# Patient Record
Sex: FEMALE | Race: WHITE | NOT HISPANIC OR LATINO | Employment: FULL TIME | URBAN - METROPOLITAN AREA
[De-identification: names, ages, dates, MRNs, and addresses within clinical notes are randomized per-mention and may not be internally consistent; named-entity substitution may affect disease eponyms.]

---

## 2017-03-13 DIAGNOSIS — E55.9 VITAMIN D DEFICIENCY: Primary | ICD-10-CM

## 2017-03-13 DIAGNOSIS — E06.3 HYPOTHYROIDISM DUE TO HASHIMOTO'S THYROIDITIS: Primary | ICD-10-CM

## 2017-03-13 DIAGNOSIS — E03.8 HYPOTHYROIDISM DUE TO HASHIMOTO'S THYROIDITIS: Primary | ICD-10-CM

## 2017-03-13 DIAGNOSIS — E06.3 HASHIMOTO'S DISEASE: ICD-10-CM

## 2017-03-14 LAB
ALBUMIN SERPL-MCNC: 4.4 G/DL (ref 3.5–5.2)
ALBUMIN/GLOB SERPL: 1.5 G/DL
ALP SERPL-CCNC: 115 U/L (ref 39–117)
ALT SERPL-CCNC: 23 U/L (ref 1–33)
AST SERPL-CCNC: 19 U/L (ref 1–32)
BILIRUB SERPL-MCNC: 0.2 MG/DL (ref 0.1–1.2)
BUN SERPL-MCNC: 12 MG/DL (ref 6–20)
BUN/CREAT SERPL: 10.9 (ref 7–25)
CALCIUM SERPL-MCNC: 10.3 MG/DL (ref 8.6–10.5)
CHLORIDE SERPL-SCNC: 101 MMOL/L (ref 98–107)
CO2 SERPL-SCNC: 25.2 MMOL/L (ref 22–29)
CREAT SERPL-MCNC: 1.1 MG/DL (ref 0.57–1)
GLOBULIN SER CALC-MCNC: 2.9 GM/DL
GLUCOSE SERPL-MCNC: 111 MG/DL (ref 65–99)
POTASSIUM SERPL-SCNC: 4.1 MMOL/L (ref 3.5–5.2)
PROT SERPL-MCNC: 7.3 G/DL (ref 6–8.5)
SODIUM SERPL-SCNC: 144 MMOL/L (ref 136–145)
T4 FREE SERPL-MCNC: 1.49 NG/DL (ref 0.93–1.7)
TSH SERPL DL<=0.005 MIU/L-ACNC: 1.18 MIU/ML (ref 0.27–4.2)

## 2017-03-15 LAB — 25(OH)D3+25(OH)D2 SERPL-MCNC: 41.3 NG/ML (ref 30–100)

## 2017-03-17 ENCOUNTER — RESULTS ENCOUNTER (OUTPATIENT)
Dept: ENDOCRINOLOGY | Age: 57
End: 2017-03-17

## 2017-03-17 DIAGNOSIS — E55.9 VITAMIN D DEFICIENCY: ICD-10-CM

## 2017-03-17 DIAGNOSIS — E03.8 HYPOTHYROIDISM DUE TO HASHIMOTO'S THYROIDITIS: ICD-10-CM

## 2017-03-17 DIAGNOSIS — E06.3 HYPOTHYROIDISM DUE TO HASHIMOTO'S THYROIDITIS: ICD-10-CM

## 2017-03-17 DIAGNOSIS — E06.3 HASHIMOTO'S DISEASE: ICD-10-CM

## 2017-06-19 ENCOUNTER — OFFICE VISIT (OUTPATIENT)
Dept: ENDOCRINOLOGY | Age: 57
End: 2017-06-19

## 2017-06-19 VITALS
OXYGEN SATURATION: 97 % | DIASTOLIC BLOOD PRESSURE: 86 MMHG | SYSTOLIC BLOOD PRESSURE: 126 MMHG | HEIGHT: 68 IN | HEART RATE: 97 BPM | WEIGHT: 209.6 LBS | BODY MASS INDEX: 31.77 KG/M2

## 2017-06-19 DIAGNOSIS — E03.8 HYPOTHYROIDISM DUE TO HASHIMOTO'S THYROIDITIS: Primary | ICD-10-CM

## 2017-06-19 DIAGNOSIS — E55.9 VITAMIN D DEFICIENCY: ICD-10-CM

## 2017-06-19 DIAGNOSIS — E06.3 HYPOTHYROIDISM DUE TO HASHIMOTO'S THYROIDITIS: Primary | ICD-10-CM

## 2017-06-19 DIAGNOSIS — E06.3 HASHIMOTO'S DISEASE: ICD-10-CM

## 2017-06-19 DIAGNOSIS — R63.5 ABNORMAL WEIGHT GAIN: ICD-10-CM

## 2017-06-19 DIAGNOSIS — R53.82 CHRONIC FATIGUE: ICD-10-CM

## 2017-06-19 PROCEDURE — 99214 OFFICE O/P EST MOD 30 MIN: CPT | Performed by: INTERNAL MEDICINE

## 2017-06-19 RX ORDER — VITAMIN E 268 MG
400 CAPSULE ORAL DAILY
COMMUNITY

## 2017-06-19 RX ORDER — LANOLIN ALCOHOL/MO/W.PET/CERES
400 CREAM (GRAM) TOPICAL DAILY
COMMUNITY

## 2017-06-20 LAB
T3 SERPL-MCNC: 113.9 NG/DL (ref 80–200)
T4 FREE SERPL-MCNC: 1.48 NG/DL (ref 0.93–1.7)
TSH SERPL DL<=0.005 MIU/L-ACNC: 0.88 MIU/ML (ref 0.27–4.2)

## 2017-06-23 RX ORDER — LEVOTHYROXINE SODIUM 88 UG/1
88 TABLET ORAL DAILY
Qty: 30 TABLET | Refills: 11 | Status: SHIPPED | OUTPATIENT
Start: 2017-06-23 | End: 2017-06-23

## 2017-06-23 RX ORDER — LEVOTHYROXINE SODIUM 88 UG/1
TABLET ORAL
Qty: 30 TABLET | Refills: 11 | Status: SHIPPED | OUTPATIENT
Start: 2017-06-23 | End: 2017-12-22 | Stop reason: SDUPTHER

## 2017-12-11 ENCOUNTER — RESULTS ENCOUNTER (OUTPATIENT)
Dept: ENDOCRINOLOGY | Age: 57
End: 2017-12-11

## 2017-12-11 DIAGNOSIS — E03.8 OTHER SPECIFIED HYPOTHYROIDISM: ICD-10-CM

## 2017-12-11 DIAGNOSIS — E55.9 VITAMIN D DEFICIENCY: ICD-10-CM

## 2017-12-11 DIAGNOSIS — E06.3 HASHIMOTO'S DISEASE: Primary | ICD-10-CM

## 2017-12-11 DIAGNOSIS — E06.3 HASHIMOTO'S DISEASE: ICD-10-CM

## 2017-12-22 ENCOUNTER — OFFICE VISIT (OUTPATIENT)
Dept: ENDOCRINOLOGY | Age: 57
End: 2017-12-22

## 2017-12-22 VITALS
BODY MASS INDEX: 31.22 KG/M2 | SYSTOLIC BLOOD PRESSURE: 120 MMHG | HEART RATE: 89 BPM | DIASTOLIC BLOOD PRESSURE: 86 MMHG | WEIGHT: 206 LBS | HEIGHT: 68 IN

## 2017-12-22 DIAGNOSIS — R63.5 ABNORMAL WEIGHT GAIN: ICD-10-CM

## 2017-12-22 DIAGNOSIS — R53.82 CHRONIC FATIGUE: ICD-10-CM

## 2017-12-22 DIAGNOSIS — E03.8 HYPOTHYROIDISM DUE TO HASHIMOTO'S THYROIDITIS: ICD-10-CM

## 2017-12-22 DIAGNOSIS — E06.3 HASHIMOTO'S DISEASE: Primary | ICD-10-CM

## 2017-12-22 DIAGNOSIS — E55.9 VITAMIN D DEFICIENCY: ICD-10-CM

## 2017-12-22 DIAGNOSIS — E06.3 HYPOTHYROIDISM DUE TO HASHIMOTO'S THYROIDITIS: ICD-10-CM

## 2017-12-22 LAB
25(OH)D3+25(OH)D2 SERPL-MCNC: 44.6 NG/ML (ref 30–100)
ALBUMIN SERPL-MCNC: 4.5 G/DL (ref 3.5–5.2)
ALBUMIN/GLOB SERPL: 1.3 G/DL
ALP SERPL-CCNC: 122 U/L (ref 39–117)
ALT SERPL-CCNC: 19 U/L (ref 1–33)
AST SERPL-CCNC: 21 U/L (ref 1–32)
BILIRUB SERPL-MCNC: 0.3 MG/DL (ref 0.1–1.2)
BUN SERPL-MCNC: 11 MG/DL (ref 6–20)
BUN/CREAT SERPL: 12.5 (ref 7–25)
CALCIUM SERPL-MCNC: 9.8 MG/DL (ref 8.6–10.5)
CHLORIDE SERPL-SCNC: 100 MMOL/L (ref 98–107)
CO2 SERPL-SCNC: 27.3 MMOL/L (ref 22–29)
CREAT SERPL-MCNC: 0.88 MG/DL (ref 0.57–1)
GLOBULIN SER CALC-MCNC: 3.4 GM/DL
GLUCOSE SERPL-MCNC: 91 MG/DL (ref 65–99)
POTASSIUM SERPL-SCNC: 4.4 MMOL/L (ref 3.5–5.2)
PROT SERPL-MCNC: 7.9 G/DL (ref 6–8.5)
SODIUM SERPL-SCNC: 142 MMOL/L (ref 136–145)
T3 SERPL-MCNC: 94.9 NG/DL (ref 80–200)
T4 FREE SERPL-MCNC: 1.55 NG/DL (ref 0.93–1.7)
TSH SERPL DL<=0.005 MIU/L-ACNC: 0.86 MIU/ML (ref 0.27–4.2)

## 2017-12-22 PROCEDURE — 99214 OFFICE O/P EST MOD 30 MIN: CPT | Performed by: INTERNAL MEDICINE

## 2017-12-22 RX ORDER — LEVOTHYROXINE SODIUM 88 UG/1
TABLET ORAL
Qty: 30 TABLET | Refills: 11 | Status: SHIPPED | OUTPATIENT
Start: 2017-12-22 | End: 2018-03-27 | Stop reason: SDUPTHER

## 2017-12-22 NOTE — PROGRESS NOTES
57 y.o.    Patient Care Team:  Dev Lopez MD as PCP - General  Dev Lopez MD as PCP - Family Medicine    Chief Complaint:      F/U HYPOTHYROIDISM.  NO RECENT LABS.    Subjective     HPI  Patient is a 57-year-old white female with a past history of hypothyroidism and Hashimoto's thyroiditis came for follow-up  Patient is accompanied by her  today    Hypothyroidism  Patient is currently taking Synthroid 88 µg daily.  She reports compliance with the medication  She denies any side effects  Hashimoto's thyroiditis  Patient denies any rash or difficulty swallowing or change in voice  Vitamin D deficiency  Patient is currently taking 50,000 units vitamin D 2 weekly  She denies any side effects on the medication  Abnormal weight gain  Patient managed to lose about 8 pounds in the past one year  I advised her to continue to lose weight  She currently weighs 206 pounds the BMI of 31.3  Chronic fatigue  Patient has been expressing chronic fatigue over the past several years  She has improved slightly when she went on gluten-free diet  She still has issues with the fatigue on a daily basis      Interval History            Summary  Patient reported that in 1982 during her first pregnancy at the age of 22, soon after delivery she was noted to be hyper thyroid and was placed on PTU for one year. After that she was able to discontinue medication and was not on any medication a long time.  At the age of 38 she had a hysterectomy  At the age of 43, she reports viral infection which was very severe and the recovery was prolonged. Subsequently she started gaining weight and became extremely fatigued and was diagnosed with hypothyroidism. At this time she started Levoxyl and apparently briefly tried Synthroid but had severe gastroesophageal reflux symptoms.  When the Levoxyl went out of the market, she started generic levothyroxine 125 mcg.  After Levoxyl came back to the market she went back on Levoxyl 125 mcg        She was also diagnosed with obstructive sleep apnea and is currently on CPAP mask for the past 5 years.  Abnormal weight gain she'll become a very big problem and she is unable to lose weight.  She was also diagnosed with severe vitamin D deficiency and started taking vitamin D. Since taking vitamin D her brain fog has lifted somewhat.  But she continues to make silly mistakes at work and she is a , becoming emotionally labile, crying easily and experiencing severe fatigue.      Patient reported that she is taking Levoxyl correctly on empty stomach in the morning does not take any medication for at least one hour.   patient reported she had ultrasound of the thyroid approximate 10 years ago and was told that her thyroid is basically atrophied  The following portions of the patient's history were reviewed and updated as appropriate: allergies, current medications, past family history, past medical history, past social history, past surgical history and problem list.    Past Medical History:   Diagnosis Date   • Hypothyroidism      Family History   Problem Relation Age of Onset   • COPD Father    • Hyperlipidemia Father    • Heart disease Father      Social History     Social History   • Marital status:      Spouse name: N/A   • Number of children: N/A   • Years of education: N/A     Occupational History   • Not on file.     Social History Main Topics   • Smoking status: Never Smoker   • Smokeless tobacco: Not on file   • Alcohol use Yes      Comment: OCC   • Drug use: No   • Sexual activity: Not on file     Other Topics Concern   • Not on file     Social History Narrative     No Known Allergies    Current Outpatient Prescriptions:   •  fluticasone (FLONASE) 50 MCG/ACT nasal spray, , Disp: , Rfl:   •  folic acid (FOLVITE) 400 MCG tablet, Take 400 mcg by mouth Daily., Disp: , Rfl:   •  levothyroxine (SYNTHROID) 88 MCG tablet, 88 mcg oral daily, Disp: 30 tablet, Rfl: 11  •  Omega-3 Fatty Acids  "(FISH OIL) 1000 MG capsule capsule, Take  by mouth daily with breakfast., Disp: , Rfl:   •  Selenium 200 MCG capsule, Take  by mouth., Disp: , Rfl:   •  vitamin A 8000 UNIT capsule, Take 8,000 Units by mouth Daily., Disp: , Rfl:   •  vitamin B-12 (CYANOCOBALAMIN) 1000 MCG tablet, Take 1,000 mcg by mouth daily., Disp: , Rfl:   •  vitamin D (ERGOCALCIFEROL) 91110 UNITS capsule capsule, , Disp: , Rfl:   •  vitamin E 400 UNIT capsule, Take 400 Units by mouth Daily., Disp: , Rfl:   •  Zinc 50 MG capsule, Take  by mouth., Disp: , Rfl:         Review of Systems   Constitutional: Negative for chills, fatigue and fever.   Cardiovascular: Negative for chest pain and palpitations.   Gastrointestinal: Negative for abdominal pain, constipation, diarrhea, nausea and vomiting.   Endocrine: Negative for cold intolerance and heat intolerance.   All other systems reviewed and are negative.      Objective       Vitals:    12/22/17 1030   BP: 120/86   Pulse: 89   Weight: 93.4 kg (206 lb)   Height: 172.7 cm (68\")     Body mass index is 31.32 kg/(m^2).      Physical Exam   Constitutional: She is oriented to person, place, and time. She appears well-developed and well-nourished.   obese   HENT:   Head: Normocephalic and atraumatic.   Eyes: EOM are normal. Pupils are equal, round, and reactive to light.   Neck: Normal range of motion. Neck supple. Thyromegaly (Thyroid gland is firm to palpation, nontender, moving well on swallowing, no nodules felt) present.   Cardiovascular: Normal rate, regular rhythm, normal heart sounds and intact distal pulses.    Pulmonary/Chest: Effort normal and breath sounds normal. No respiratory distress. She has no wheezes.   Abdominal: Soft. Bowel sounds are normal. She exhibits distension. There is no tenderness.   Musculoskeletal: Normal range of motion. She exhibits no edema.   Lymphadenopathy:     She has no cervical adenopathy.   Neurological: She is alert and oriented to person, place, and time.   Skin: " Skin is warm.   Psychiatric: She has a normal mood and affect. Her behavior is normal.   Nursing note and vitals reviewed.    Results Review:     I reviewed the patient's new clinical results.    Medical records reviewed  Summary:      Office Visit on 06/19/2017   Component Date Value Ref Range Status   • Free T4 06/19/2017 1.48  0.93 - 1.70 ng/dL Final   • TSH 06/19/2017 0.880  0.270 - 4.200 mIU/mL Final   • T3, Total 06/19/2017 113.9  80.0 - 200.0 ng/dL Final     No results found for: HGBA1C  Lab Results   Component Value Date    CREATININE 1.10 (H) 03/14/2017     Imaging Results (most recent)     None                Assessment and Plan:    Geni was seen today for hypothyroidism.    Diagnoses and all orders for this visit:    Hashimoto's disease  -     Comprehensive Metabolic Panel  -     T4, Free  -     TSH  -     T3  -     Vitamin D 25 Hydroxy    Vitamin D deficiency  -     Comprehensive Metabolic Panel  -     T4, Free  -     TSH  -     T3  -     Vitamin D 25 Hydroxy    Hypothyroidism due to Hashimoto's thyroiditis  -     Comprehensive Metabolic Panel  -     T4, Free  -     TSH  -     T3  -     Vitamin D 25 Hydroxy    Abnormal weight gain    Chronic fatigue    Other orders  -     levothyroxine (SYNTHROID) 88 MCG tablet; 88 mcg oral daily      Patient is currently taking levothyroxine 88 µg daily.  She is tolerating the medication  Her energy levels have improved since following the gluten-free diet  Her GI symptoms also improved significantly but she is not losing weight  She is planning to do ketogenic diet to lose weight    I advised the patient instead to consider low-carb diet  I also briefly discussed the use of phentermine for weight loss  Patient wants to try the diet first and exercise    Patient will get lab testing done today  She will return to follow-up in 6 months     The total time spent for old record and lab review and face- to- face was more than 25 min of which greater than 15 min of time  "was spent on counseling the patient on recommended evaluation and treatment options, instructions for management/treatment and /or follow up  and importance of compliance with chosen management or treatment options  Tano Puckett MD. FACE    12/22/17      EMR Dragon / transcription disclaimer:     \"Dictated utilizing Dragon dictation\".         "

## 2018-03-27 ENCOUNTER — TELEPHONE (OUTPATIENT)
Dept: ENDOCRINOLOGY | Age: 58
End: 2018-03-27

## 2018-03-27 RX ORDER — LEVOTHYROXINE SODIUM 88 MCG
TABLET ORAL
Qty: 30 TABLET | Refills: 1 | Status: SHIPPED | OUTPATIENT
Start: 2018-03-27

## 2018-03-27 NOTE — TELEPHONE ENCOUNTER
----- Message from Angela Puentes sent at 3/27/2018  3:44 PM EDT -----  Contact: PATIENT     PATIENT/ANTHONY MCFARLANDUEST TO REFIL MEDICATION  MEDICATION:levothyroxine (SYNTHROID) 88 MCG tablet   MESSAGE: PATIENT HAS ASKED FOR THE FOLLOWING MEDICATION  TO BE REFILLED TO THE FOLLOWING PHARMACY   Pharmacy:  [facility] - [place], [place] - [address] [place] [address] - [phone]  - [phone] FX Phone:  [phone] Fax:  [phone]   Address:  [address] [place] [id number]    PHONE NUMBER:  [phone]    SCRIPT SENT

## 2018-04-02 ENCOUNTER — TELEPHONE (OUTPATIENT)
Dept: ENDOCRINOLOGY | Age: 58
End: 2018-04-02

## 2018-04-02 DIAGNOSIS — E06.3 HYPOTHYROIDISM DUE TO HASHIMOTO'S THYROIDITIS: Primary | ICD-10-CM

## 2018-04-02 DIAGNOSIS — E03.8 HYPOTHYROIDISM DUE TO HASHIMOTO'S THYROIDITIS: Primary | ICD-10-CM

## 2018-04-02 DIAGNOSIS — E06.3 HASHIMOTO'S DISEASE: ICD-10-CM

## 2018-04-02 DIAGNOSIS — E55.9 VITAMIN D DEFICIENCY: ICD-10-CM

## 2018-04-02 NOTE — TELEPHONE ENCOUNTER
----- Message from Angela Puentes sent at 4/2/2018  9:56 AM EDT -----  Contact: PATIENT    PATIENT IS REQUESTIN EXTERNAL LABS  FAX NUMBER:  [phone]  MESSAGE:  PATIENT IS ASKING FOR THE EXTERNAL LAB BE SENT OVER TO [facility]. ALSO PATIENT IS ASKING FOR A CALL BACK TO WHEN YOU DO SENT OUT THE LABS  SO WE CAN MAKE AN APPT.    PHONE NUMBER:     [phone]      LAB ORDERS FAXED TO [facility]

## 2018-04-04 ENCOUNTER — RESULTS ENCOUNTER (OUTPATIENT)
Dept: ENDOCRINOLOGY | Age: 58
End: 2018-04-04

## 2018-04-04 DIAGNOSIS — E55.9 VITAMIN D DEFICIENCY: ICD-10-CM

## 2018-04-04 DIAGNOSIS — E06.3 HASHIMOTO'S DISEASE: ICD-10-CM

## 2018-04-04 DIAGNOSIS — E03.8 HYPOTHYROIDISM DUE TO HASHIMOTO'S THYROIDITIS: ICD-10-CM

## 2018-04-04 DIAGNOSIS — E06.3 HYPOTHYROIDISM DUE TO HASHIMOTO'S THYROIDITIS: ICD-10-CM

## 2018-04-04 LAB
25(OH)D3+25(OH)D2 SERPL-MCNC: 55.6 NG/ML (ref 30–100)
ALBUMIN SERPL-MCNC: 4.1 G/DL (ref 3.5–5.5)
ALBUMIN/GLOB SERPL: 1.4 {RATIO} (ref 1.2–2.2)
ALP SERPL-CCNC: 100 IU/L (ref 39–117)
ALT SERPL-CCNC: 19 IU/L (ref 0–32)
AST SERPL-CCNC: 19 IU/L (ref 0–40)
BILIRUB SERPL-MCNC: <0.2 MG/DL (ref 0–1.2)
BUN SERPL-MCNC: 10 MG/DL (ref 6–24)
BUN/CREAT SERPL: 10 (ref 9–23)
CALCIUM SERPL-MCNC: 9.6 MG/DL (ref 8.7–10.2)
CHLORIDE SERPL-SCNC: 100 MMOL/L (ref 96–106)
CO2 SERPL-SCNC: 25 MMOL/L (ref 18–29)
CREAT SERPL-MCNC: 1.01 MG/DL (ref 0.57–1)
GFR SERPLBLD CREATININE-BSD FMLA CKD-EPI: 61 ML/MIN/1.73
GFR SERPLBLD CREATININE-BSD FMLA CKD-EPI: 70 ML/MIN/1.73
GLOBULIN SER CALC-MCNC: 2.9 G/DL (ref 1.5–4.5)
GLUCOSE SERPL-MCNC: 121 MG/DL (ref 65–99)
POTASSIUM SERPL-SCNC: 4 MMOL/L (ref 3.5–5.2)
PROT SERPL-MCNC: 7 G/DL (ref 6–8.5)
SODIUM SERPL-SCNC: 141 MMOL/L (ref 134–144)
T3 SERPL-MCNC: 99 NG/DL (ref 71–180)
T4 FREE SERPL-MCNC: 1.31 NG/DL (ref 0.82–1.77)
TSH SERPL DL<=0.005 MIU/L-ACNC: 2.23 UIU/ML (ref 0.45–4.5)

## 2018-04-13 ENCOUNTER — TELEPHONE (OUTPATIENT)
Dept: ENDOCRINOLOGY | Age: 58
End: 2018-04-13

## 2018-04-13 RX ORDER — LEVOTHYROXINE SODIUM 88 UG/1
88 CAPSULE ORAL DAILY
Qty: 30 CAPSULE | Refills: 3 | Status: SHIPPED | OUTPATIENT
Start: 2018-04-13

## 2018-04-13 NOTE — TELEPHONE ENCOUNTER
----- Message from Angela Puentes sent at 4/12/2018 10:04 AM EDT -----  Contact: [facility] REQUEST TO REFIL ISSUES WITH  MEDICATION:    MEDICATION:  SYNTHROID 88 MCG tablet   MESSAGE:  [facility] HAS CALLED IN REGARDS TO GETTING CLARIFICATION ON  THE MEDICATION.    THEY ARE STATING THE PATIENT HAS GOTTEN BRAND NAME FOR THIS MEDICATION AND THEY ARE NOT SURE WHICH ONE THEY NEED TO REFILL.    PHARMACY IS ASKING FOR A CALL BACK   PHONE NUMBER:  [phone]    SCRIP SENT FOR TIROSINT 88MCG

## 2020-05-31 ENCOUNTER — HOSPITAL ENCOUNTER (EMERGENCY)
Facility: HOSPITAL | Age: 60
Discharge: HOME | End: 2020-06-01
Attending: EMERGENCY MEDICINE | Admitting: EMERGENCY MEDICINE

## 2020-05-31 ENCOUNTER — APPOINTMENT (OUTPATIENT)
Dept: GENERAL RADIOLOGY | Facility: HOSPITAL | Age: 60
End: 2020-05-31

## 2020-05-31 DIAGNOSIS — S82.62XA CLOSED DISPLACED FRACTURE OF LATERAL MALLEOLUS OF LEFT FIBULA, INITIAL ENCOUNTER: ICD-10-CM

## 2020-05-31 DIAGNOSIS — S82.61XA CLOSED AVULSION FRACTURE OF LATERAL MALLEOLUS OF RIGHT FIBULA, INITIAL ENCOUNTER: Primary | ICD-10-CM

## 2020-05-31 PROCEDURE — 99284 EMERGENCY DEPT VISIT MOD MDM: CPT

## 2020-05-31 PROCEDURE — 73610 X-RAY EXAM OF ANKLE: CPT

## 2020-05-31 PROCEDURE — 73590 X-RAY EXAM OF LOWER LEG: CPT

## 2020-05-31 RX ORDER — ACETAMINOPHEN 500 MG
1000 TABLET ORAL ONCE
Status: DISCONTINUED | OUTPATIENT
Start: 2020-05-31 | End: 2020-05-31

## 2020-05-31 RX ORDER — IBUPROFEN 800 MG/1
800 TABLET ORAL ONCE
Status: COMPLETED | OUTPATIENT
Start: 2020-05-31 | End: 2020-05-31

## 2020-05-31 RX ADMIN — IBUPROFEN 800 MG: 800 TABLET ORAL at 22:51

## 2020-06-01 VITALS
BODY MASS INDEX: 31.83 KG/M2 | WEIGHT: 210 LBS | HEART RATE: 87 BPM | RESPIRATION RATE: 16 BRPM | OXYGEN SATURATION: 97 % | TEMPERATURE: 96 F | HEIGHT: 68 IN | SYSTOLIC BLOOD PRESSURE: 122 MMHG | DIASTOLIC BLOOD PRESSURE: 71 MMHG

## 2020-06-01 RX ORDER — HYDROCODONE BITARTRATE AND ACETAMINOPHEN 5; 325 MG/1; MG/1
1 TABLET ORAL EVERY 6 HOURS PRN
Qty: 15 TABLET | Refills: 0 | Status: SHIPPED | OUTPATIENT
Start: 2020-06-01

## 2020-06-01 NOTE — DISCHARGE INSTRUCTIONS
Return to the ER with any further concerns, should your condition change/worsen, or should you develop a fever.  Avoid weightbearing on the left ankle until cleared to do so by your orthopedist.

## 2020-06-01 NOTE — ED PROVIDER NOTES
MD ATTESTATION NOTE    The KULDIP and I have discussed this patient's history, physical exam, and treatment plan.  I have reviewed the documentation and personally had a face to face interaction with the patient. I affirm the documentation and agree with the treatment and plan.  The attached note describes my personal findings.    Patient presents with pain in both ankles after a fall.  She has been walking around on them all afternoon, but there beginning to hurt worse.    She has an Aircast on the left foot and nothing on the right.  There is some soft tissue swelling but no obvious deformity.    Surprisingly she has a spiral minimally displaced fracture of the left distal fibula.  She also has avulsion fractures of both the medial and lateral malleolus on the right ankle.  Neither ankle is unstable, and she is incredibly tough to be able to handle the pain.    We did place her in a walking boot for the left leg and an air splint for the right.  We have encouraged her to stay off of them as much as possible, ice them and follow-up with orthopedics     Javi Lopez MD  06/01/20 3881

## 2020-06-01 NOTE — ED PROVIDER NOTES
EMERGENCY DEPARTMENT ENCOUNTER    Room Number:  08/08  Date of encounter:  6/1/2020  PCP: Dev Lopez MD  Historian: Patient      HPI:  Chief Complaint: Bilateral ankles injury  A complete HPI/ROS/PMH/PSH/SH/FH are unobtainable due to: Nothing    Context: Geni York is a 59 y.o. female who presents to the ED c/o bilateral ankle injury that occurred earlier this evening.  The patient states she was walking down some steps initially rolling the left ankle and twisting the right ankle and recovering from the fall.  She describes the pain is tolerable 6 out of 10, sharp in nature, nonradiating, and localized to the lateral aspect of both ankles.  The pain is made worse with ambulation and improves with rest.  Treatment prior to arrival includes 100 mg of ibuprofen and rest.  The patient is able to ambulate on both ankles at this time.      PAST MEDICAL HISTORY  Active Ambulatory Problems     Diagnosis Date Noted   • Hypothyroidism 09/01/2016   • Chronic fatigue 09/01/2016   • Hashimoto's disease 09/01/2016   • Vitamin D deficiency 09/01/2016   • Abnormal weight gain 06/19/2017     Resolved Ambulatory Problems     Diagnosis Date Noted   • No Resolved Ambulatory Problems     No Additional Past Medical History         PAST SURGICAL HISTORY  Past Surgical History:   Procedure Laterality Date   • BREAST BIOPSY     • HYSTERECTOMY           FAMILY HISTORY  Family History   Problem Relation Age of Onset   • COPD Father    • Hyperlipidemia Father    • Heart disease Father          SOCIAL HISTORY  Social History     Socioeconomic History   • Marital status:      Spouse name: Not on file   • Number of children: Not on file   • Years of education: Not on file   • Highest education level: Not on file   Tobacco Use   • Smoking status: Never Smoker   • Smokeless tobacco: Never Used   Substance and Sexual Activity   • Alcohol use: Yes     Comment: OCC   • Drug use: No         ALLERGIES  Patient has no known  allergies.        REVIEW OF SYSTEMS  Review of Systems     All systems reviewed and negative except for those discussed in HPI.       PHYSICAL EXAM    I have reviewed the triage vital signs and nursing notes.    ED Triage Vitals   Temp Heart Rate Resp BP SpO2   05/31/20 2045 05/31/20 2045 05/31/20 2045 05/31/20 2057 05/31/20 2045   96 °F (35.6 °C) 106 18 151/100 98 %      Temp src Heart Rate Source Patient Position BP Location FiO2 (%)   05/31/20 2045 05/31/20 2045 -- 05/31/20 2253 --   Tympanic Monitor  Right arm        Physical Exam  GENERAL: No acute distress  HENT: nares patent mucous membranes normal.  NCAT  EYES: no scleral icterus, conjunctiva normal  CV: regular rhythm, regular rate, without rubs murmurs gallops, cap refill normal, +2 DP and PT bilateral  RESPIRATORY: normal effort  ABDOMEN: soft, nontender   MUSCULOSKELETAL: TTP bilateral lateral malleoli.  Slight limited dorsiflexion of the left ankle secondary to soft tissue swelling and pain.  Achilles and tendon intact bilateral, negative pain with foot squeeze bilateral, negative pain with proximal tib-fib squeeze bilateral  NEURO: alert, moves all extremities, follows commands, no focal neuro deficits, sharp sensation intact  SKIN: warm, dry, mild ecchymosis lateral aspect of left ankle        LAB RESULTS  No results found for this or any previous visit (from the past 24 hour(s)).    Ordered the above labs and independently reviewed the results.        RADIOLOGY  Xr Ankle 3+ View Bilateral    Result Date: 5/31/2020  4 VIEWS OF THE BILATERAL TIBIA AND FIBULA; 6 VIEWS OF THE BILATERAL ANKLES  HISTORY: Bilateral ankle pain after a fall  COMPARISON: None available.  FINDINGS: Bilateral tibia and fibula: No acute fracture or subluxation of either proximal tibia or fibula is seen. No effusions are identified.  Bilateral ankles: There is a comminuted obliquely oriented fracture of the distal fibular diametaphysis. Patient appears to have a chronic avulsion  fracture of the left lateral malleolus, as well as a chronic avulsion fracture of the left medial malleolus. Marked soft tissue swelling is noted about the left ankle, particularly overlying the lateral malleolus. The patient also has a fracture involving the lateral malleolus on the right. There is soft tissue swelling overlying the lateral malleolus. There is also a lucency involving the tip of the medial malleolus on the right, and avulsion fracture is not excluded.       1. Comminuted obliquely oriented fracture of the distal left fibular diametaphysis. 2. Fracture of the right medial malleolus. Lucency is noted involving the medial malleolus on the right as well, and avulsion fracture is not excluded. 3. No proximal fracture seen.  This report was finalized on 5/31/2020 9:45 PM by Dr. Melanie Dominguez M.D.      Xr Tibia Fibula 2 View Bilateral    Result Date: 5/31/2020  4 VIEWS OF THE BILATERAL TIBIA AND FIBULA; 6 VIEWS OF THE BILATERAL ANKLES  HISTORY: Bilateral ankle pain after a fall  COMPARISON: None available.  FINDINGS: Bilateral tibia and fibula: No acute fracture or subluxation of either proximal tibia or fibula is seen. No effusions are identified.  Bilateral ankles: There is a comminuted obliquely oriented fracture of the distal fibular diametaphysis. Patient appears to have a chronic avulsion fracture of the left lateral malleolus, as well as a chronic avulsion fracture of the left medial malleolus. Marked soft tissue swelling is noted about the left ankle, particularly overlying the lateral malleolus. The patient also has a fracture involving the lateral malleolus on the right. There is soft tissue swelling overlying the lateral malleolus. There is also a lucency involving the tip of the medial malleolus on the right, and avulsion fracture is not excluded.       1. Comminuted obliquely oriented fracture of the distal left fibular diametaphysis. 2. Fracture of the right medial malleolus. Lucency is  noted involving the medial malleolus on the right as well, and avulsion fracture is not excluded. 3. No proximal fracture seen.  This report was finalized on 5/31/2020 9:45 PM by Dr. Melanie Dominguez M.D.        I ordered the above noted radiological studies. Reviewed by me and discussed with radiologist.  See dictation for official radiology interpretation.      PROCEDURES    Procedures      MEDICATIONS GIVEN IN ER    Medications   ibuprofen (ADVIL,MOTRIN) tablet 800 mg (800 mg Oral Given 5/31/20 7586)         PROGRESS, DATA ANALYSIS, CONSULTS, AND MEDICAL DECISION MAKING    All labs have been independently reviewed by me.  All radiology studies have been reviewed by me and discussed with radiologist dictating the report.   EKG's independently viewed and interpreted by me.  Discussion below represents my analysis of pertinent findings related to patient's condition, differential diagnosis, treatment plan and final disposition.    Differential diagnosis: Bilateral grade 1 ankle sprain, bilateral grade 2 ankle sprain, bilateral metatarsal fracture, bilateral proximal fibula fracture, bilateral distal fibula fracture.  X-rays confirm bilateral distal fibula fracture.  The fibula fracture on the right is an avulsion fracture the fibula fracture on the left is at the level of the mortise and oblique.  However both mortise remain intact.  Dr. Lopez my supervising physician is seen and evaluated the patient.  Plan to treat the left ankle with long orthopedic boot in the right ankle with Ace wrap.  Will provide crutches and a scooter to assist with ambulation and have follow-up with orthopedist.       Prior to seeing patient I performed extensive hand washing, saw to it that the patient was wearing a face mask.  Before entering into the room I worse gloves, glasses, and a face mask.  Prior to leaving the room I doffed my gear and performed handwashing.    AS OF 05:32 VITALS:    BP - 122/71  HR - 87  TEMP - 96 °F (35.6 °C)  (Tympanic)  O2 SATS - 97%        DIAGNOSIS  Final diagnoses:   Closed avulsion fracture of lateral malleolus of right fibula, initial encounter   Closed displaced fracture of lateral malleolus of left fibula, initial encounter         DISPOSITION  DISCHARGE    Patient discharged in stable condition.    Reviewed implications of results, diagnosis, meds, responsibility to follow up, warning signs and symptoms of possible worsening, potential complications and reasons to return to ER.    Patient/Family voiced understanding of above instructions.    Discussed plan for discharge, as there is no emergent indication for admission. Patient referred to primary care provider for BP management due to today's BP. Pt/family is agreeable and understands need for follow up and repeat testing.  Pt is aware that discharge does not mean that nothing is wrong but it indicates no emergency is present that requires admission and they must continue care with follow-up as given below or physician of their choice.     FOLLOW-UP  Devon Portillo MD  [address] [id number] [phone]    Schedule an appointment as soon as possible for a visit   For further evaluation and treatment         Medication List      New Prescriptions    HYDROcodone-acetaminophen 5-325 MG per tablet  Commonly known as:  Norco  Take 1 tablet by mouth Every 6 (Six) Hours As Needed for Severe Pain .                   Livan Peguero III, PA  06/01/20 4883

## 2020-06-01 NOTE — ED TRIAGE NOTES
Pt to ER via PV c/o fall and ankle pain. Pt reports she stepped wrong and fell. Pt denies hitting head. Pt reports hearing/feelling popping in both ankles after falling. Pt took 2 aleve PTA. Pt also reports pain to L thumb. Masked in triage, staff in appropriate ppe.

## 2021-03-19 ENCOUNTER — BULK ORDERING (OUTPATIENT)
Dept: CASE MANAGEMENT | Facility: OTHER | Age: 61
End: 2021-03-19

## 2021-03-19 DIAGNOSIS — Z23 IMMUNIZATION DUE: ICD-10-CM

## 2021-03-20 ENCOUNTER — IMMUNIZATION (OUTPATIENT)
Dept: VACCINE CLINIC | Facility: HOSPITAL | Age: 61
End: 2021-03-20

## 2021-03-20 DIAGNOSIS — Z23 IMMUNIZATION DUE: ICD-10-CM

## 2021-03-20 PROCEDURE — 0001A: CPT | Performed by: INTERNAL MEDICINE

## 2021-03-20 PROCEDURE — 91300 HC SARSCOV02 VAC 30MCG/0.3ML IM: CPT | Performed by: INTERNAL MEDICINE

## 2021-04-10 ENCOUNTER — IMMUNIZATION (OUTPATIENT)
Dept: VACCINE CLINIC | Facility: HOSPITAL | Age: 61
End: 2021-04-10

## 2021-04-10 PROCEDURE — 91300 HC SARSCOV02 VAC 30MCG/0.3ML IM: CPT | Performed by: INTERNAL MEDICINE

## 2021-04-10 PROCEDURE — 0002A: CPT | Performed by: INTERNAL MEDICINE

## 2021-11-27 ENCOUNTER — IMMUNIZATION (OUTPATIENT)
Dept: VACCINE CLINIC | Facility: HOSPITAL | Age: 61
End: 2021-11-27

## 2021-11-27 PROCEDURE — 0004A ADM SARSCOV2 30MCG/0.3ML BOOSTER: CPT | Performed by: INTERNAL MEDICINE

## 2021-11-27 PROCEDURE — 91300 HC SARSCOV02 VAC 30MCG/0.3ML IM: CPT | Performed by: INTERNAL MEDICINE

## 2023-10-26 ENCOUNTER — TRANSCRIBE ORDERS (OUTPATIENT)
Dept: ADMINISTRATIVE | Facility: HOSPITAL | Age: 63
End: 2023-10-26
Payer: MEDICARE

## 2023-10-26 DIAGNOSIS — Z12.31 VISIT FOR SCREENING MAMMOGRAM: Primary | ICD-10-CM

## 2023-11-15 ENCOUNTER — HOSPITAL ENCOUNTER (OUTPATIENT)
Dept: MAMMOGRAPHY | Facility: HOSPITAL | Age: 63
Discharge: HOME | End: 2023-11-15
Admitting: FAMILY MEDICINE
Payer: MEDICARE

## 2023-11-15 DIAGNOSIS — Z12.31 VISIT FOR SCREENING MAMMOGRAM: ICD-10-CM

## 2023-11-15 PROCEDURE — 77063 BREAST TOMOSYNTHESIS BI: CPT

## 2023-11-15 PROCEDURE — 77067 SCR MAMMO BI INCL CAD: CPT
